# Patient Record
Sex: FEMALE | NOT HISPANIC OR LATINO | URBAN - METROPOLITAN AREA
[De-identification: names, ages, dates, MRNs, and addresses within clinical notes are randomized per-mention and may not be internally consistent; named-entity substitution may affect disease eponyms.]

---

## 2019-10-28 ENCOUNTER — OFFICE VISIT (OUTPATIENT)
Dept: URBAN - METROPOLITAN AREA CLINIC 40 | Facility: CLINIC | Age: 29
End: 2019-10-28
Payer: COMMERCIAL

## 2019-10-28 DIAGNOSIS — H52.13 MYOPIA, BILATERAL: Primary | ICD-10-CM

## 2019-10-28 PROCEDURE — 92014 COMPRE OPH EXAM EST PT 1/>: CPT | Performed by: OPTOMETRIST

## 2019-10-28 ASSESSMENT — INTRAOCULAR PRESSURE
OD: 15
OS: 13

## 2019-10-28 ASSESSMENT — VISUAL ACUITY
OS: 20/20
OD: 20/20

## 2019-10-28 ASSESSMENT — KERATOMETRY
OD: 44.38
OS: 44.13

## 2019-10-28 NOTE — IMPRESSION/PLAN
Impression: Myopia, bilateral: H52.13. Plan: Discussed diagnosis in detail with patient. New glasses Rx was given today. Recommend UV protection. Potential for initial adaptation discussed.

## 2021-07-07 ENCOUNTER — OFFICE VISIT (OUTPATIENT)
Dept: URBAN - METROPOLITAN AREA CLINIC 40 | Facility: CLINIC | Age: 31
End: 2021-07-07
Payer: COMMERCIAL

## 2021-07-07 PROCEDURE — 92014 COMPRE OPH EXAM EST PT 1/>: CPT | Performed by: OPTOMETRIST

## 2021-07-07 ASSESSMENT — VISUAL ACUITY
OS: 20/20
OD: 20/20

## 2021-07-07 ASSESSMENT — INTRAOCULAR PRESSURE
OS: 19
OD: 18

## 2023-11-13 ENCOUNTER — OFFICE VISIT (OUTPATIENT)
Dept: URBAN - METROPOLITAN AREA CLINIC 40 | Facility: CLINIC | Age: 33
End: 2023-11-13
Payer: COMMERCIAL

## 2023-11-13 DIAGNOSIS — H52.13 MYOPIA, BILATERAL: Primary | ICD-10-CM

## 2023-11-13 PROCEDURE — 92014 COMPRE OPH EXAM EST PT 1/>: CPT | Performed by: OPTOMETRIST

## 2023-11-13 ASSESSMENT — VISUAL ACUITY
OS: 20/20
OD: 20/20

## 2023-11-13 ASSESSMENT — INTRAOCULAR PRESSURE
OD: 18
OS: 16

## 2023-11-13 ASSESSMENT — KERATOMETRY
OS: 44.00
OD: 44.25